# Patient Record
Sex: MALE | Race: WHITE | Employment: FULL TIME | URBAN - METROPOLITAN AREA
[De-identification: names, ages, dates, MRNs, and addresses within clinical notes are randomized per-mention and may not be internally consistent; named-entity substitution may affect disease eponyms.]

---

## 2017-04-03 ENCOUNTER — OFFICE VISIT (OUTPATIENT)
Dept: FAMILY MEDICINE CLINIC | Facility: CLINIC | Age: 52
End: 2017-04-03

## 2017-04-03 VITALS
DIASTOLIC BLOOD PRESSURE: 78 MMHG | HEIGHT: 71 IN | TEMPERATURE: 98.2 F | BODY MASS INDEX: 30.94 KG/M2 | OXYGEN SATURATION: 98 % | HEART RATE: 70 BPM | WEIGHT: 221 LBS | SYSTOLIC BLOOD PRESSURE: 132 MMHG | RESPIRATION RATE: 17 BRPM

## 2017-04-03 DIAGNOSIS — B30.9 VIRAL CONJUNCTIVITIS: ICD-10-CM

## 2017-04-03 DIAGNOSIS — J02.9 SORE THROAT: ICD-10-CM

## 2017-04-03 DIAGNOSIS — J32.9 VIRAL SINUSITIS: Primary | ICD-10-CM

## 2017-04-03 DIAGNOSIS — B97.89 VIRAL SINUSITIS: Primary | ICD-10-CM

## 2017-04-03 LAB
S PYO AG THROAT QL: NEGATIVE
VALID INTERNAL CONTROL?: YES

## 2017-04-03 RX ORDER — AMOXICILLIN AND CLAVULANATE POTASSIUM 875; 125 MG/1; MG/1
1 TABLET, FILM COATED ORAL 2 TIMES DAILY
Qty: 10 TAB | Refills: 0 | Status: SHIPPED | OUTPATIENT
Start: 2017-04-03 | End: 2017-04-08

## 2017-04-03 NOTE — PATIENT INSTRUCTIONS
Pinkeye: Care Instructions  Your Care Instructions    Pinkeye is redness and swelling of the eye surface and the conjunctiva (the lining of the eyelid and the covering of the white part of the eye). Pinkeye is also called conjunctivitis. Pinkeye is often caused by infection with bacteria or a virus. Dry air, allergies, smoke, and chemicals are other common causes. Pinkeye often clears on its own in 7 to 10 days. Antibiotics only help if the pinkeye is caused by bacteria. Pinkeye caused by infection spreads easily. If an allergy or chemical is causing pinkeye, it will not go away unless you can avoid whatever is causing it. Follow-up care is a key part of your treatment and safety. Be sure to make and go to all appointments, and call your doctor if you are having problems. Its also a good idea to know your test results and keep a list of the medicines you take. How can you care for yourself at home? · Wash your hands often. Always wash them before and after you treat pinkeye or touch your eyes or face. · Use moist cotton or a clean, wet cloth to remove crust. Wipe from the inside corner of the eye to the outside. Use a clean part of the cloth for each wipe. · Put cold or warm wet cloths on your eye a few times a day if the eye hurts. · Do not wear contact lenses or eye makeup until the pinkeye is gone. Throw away any eye makeup you were using when you got pinkeye. Clean your contacts and storage case. If you wear disposable contacts, use a new pair when your eye has cleared and it is safe to wear contacts again. · If the doctor gave you antibiotic ointment or eyedrops, use them as directed. Use the medicine for as long as instructed, even if your eye starts looking better soon. Keep the bottle tip clean, and do not let it touch the eye area. · To put in eyedrops or ointment:  ¨ Tilt your head back, and pull your lower eyelid down with one finger.   ¨ Drop or squirt the medicine inside the lower lid.  ¨ Close your eye for 30 to 60 seconds to let the drops or ointment move around. ¨ Do not touch the ointment or dropper tip to your eyelashes or any other surface. · Do not share towels, pillows, or washcloths while you have pinkeye. When should you call for help? Call your doctor now or seek immediate medical care if:  · You have pain in your eye, not just irritation on the surface. · You have a change in vision or loss of vision. · You have an increase in discharge from the eye. · Your eye has not started to improve or begins to get worse within 48 hours after you start using antibiotics. · Pinkeye lasts longer than 7 days. Watch closely for changes in your health, and be sure to contact your doctor if you have any problems. Where can you learn more? Go to http://deepa-marta.info/. Enter Y392 in the search box to learn more about \"Pinkeye: Care Instructions. \"  Current as of: May 27, 2016  Content Version: 11.2  © 1201-9099 [employer]. Care instructions adapted under license by [employer] (which disclaims liability or warranty for this information). If you have questions about a medical condition or this instruction, always ask your healthcare professional. [address] any warranty or liability for your use of this information. Sinusitis: Care Instructions  Your Care Instructions    Sinusitis is an infection of the lining of the sinus cavities in your head. Sinusitis often follows a cold. It causes pain and pressure in your head and face. In most cases, sinusitis gets better on its own in 1 to 2 weeks. But some mild symptoms may last for several weeks. Sometimes antibiotics are needed. Follow-up care is a key part of your treatment and safety. Be sure to make and go to all appointments, and call your doctor if you are having problems.  It's also a good idea to know your test results and keep a list of the medicines you take.  How can you care for yourself at home? · Take an over-the-counter pain medicine, such as acetaminophen (Tylenol), ibuprofen (Advil, Motrin), or naproxen (Aleve). Read and follow all instructions on the label. · If the doctor prescribed antibiotics, take them as directed. Do not stop taking them just because you feel better. You need to take the full course of antibiotics. · Be careful when taking over-the-counter cold or flu medicines and Tylenol at the same time. Many of these medicines have acetaminophen, which is Tylenol. Read the labels to make sure that you are not taking more than the recommended dose. Too much acetaminophen (Tylenol) can be harmful. · Breathe warm, moist air from a steamy shower, a hot bath, or a sink filled with hot water. Avoid cold, dry air. Using a humidifier in your home may help. Follow the directions for cleaning the machine. · Use saline (saltwater) nasal washes to help keep your nasal passages open and wash out mucus and bacteria. You can buy saline nose drops at a grocery store or drugstore. Or you can make your own at home by adding 1 teaspoon of salt and 1 teaspoon of baking soda to 2 cups of distilled water. If you make your own, fill a bulb syringe with the solution, insert the tip into your nostril, and squeeze gently. Marques Rosman your nose. · Put a hot, wet towel or a warm gel pack on your face 3 or 4 times a day for 5 to 10 minutes each time. · Try a decongestant nasal spray like oxymetazoline (Afrin). Do not use it for more than 3 days in a row. Using it for more than 3 days can make your congestion worse. When should you call for help? Call your doctor now or seek immediate medical care if:  · You have new or worse swelling or redness in your face or around your eyes. · You have a new or higher fever. Watch closely for changes in your health, and be sure to contact your doctor if:  · You have new or worse facial pain.   · The mucus from your nose becomes thicker (like pus) or has new blood in it. · You are not getting better as expected. Where can you learn more? Go to http://deepa-marta.info/. Enter O336 in the search box to learn more about \"Sinusitis: Care Instructions. \"  Current as of: July 29, 2016  Content Version: 11.2  © [id number] [employer]. Care instructions adapted under license by [employer] (which disclaims liability or warranty for this information). If you have questions about a medical condition or this instruction, always ask your healthcare professional. [address] any warranty or liability for your use of this information. Amoxicillin/Clavulanate Potassium (By mouth)   Amoxicillin (x-hft-[id number]-AG-in), Clavulanate Potassium (PLDQ-he-ha-rich [id number]-BGF-xh-um)  Treats infections. This medicine is a penicillin antibiotic. Brand Name(s):Augmentin, Augmentin ES-600, Augmentin XR   There may be other brand names for this medicine. When This Medicine Should Not Be Used: This medicine is not right for everyone. Do not use it if you had an allergic reaction to amoxicillin, clavulanate, or a similar antibiotic (penicillin or cephalosporin), or if you had liver problems caused by Augmentin®. How to Use This Medicine:   Liquid, Tablet, Chewable Tablet, Long Acting Tablet  · Your doctor will tell you how much medicine to use. Do not use more than directed. · Take this medicine with a snack or at the beginning of a meal to help prevent nausea. · Chewable tablets: Chew the tablet completely before you swallow it. · Measure the oral liquid medicine with a marked measuring spoon, oral syringe, or medicine cup. Shake the medicine well just before you measure each dose. Rinse the spoon or dropper after each use. · Swallow the extended-release tablet whole. Do not crush, break, or chew it.   · Take all of the medicine in your prescription to clear up your infection, even if you feel better after the first few doses. · Missed dose: Take a dose as soon as you remember. If it is almost time for your next dose, wait until then and take a regular dose. Do not take extra medicine to make up for a missed dose. · Tablet, extended-release tablet, chewable tablet: Store at room temperature, away from heat, moisture, and direct light. · Oral liquid: Store in the refrigerator. Do not freeze. · Throw away any unused oral liquid after 10 days. Drugs and Foods to Avoid:   Ask your doctor or pharmacist before using any other medicine, including over-the-counter medicines, vitamins, and herbal products. · Some medicines can affect how this medicine works. Tell your doctor if you are taking a blood thinner (such as warfarin), allopurinol, or probenecid. Warnings While Using This Medicine:   · Tell your doctor if you are pregnant or breastfeeding, or if you have kidney disease, liver disease, or mononucleosis (mono). · Birth control pills may not work as well while you are taking this medicine. Use another form of birth control to prevent pregnancy. · This medicine can cause diarrhea. Call your doctor if the diarrhea becomes severe, does not stop, or is bloody. Do not take any medicine to stop diarrhea until you have talked to your doctor. Diarrhea can occur 2 months or more after you stop taking this medicine. · Tell any doctor or dentist who treats you that you are using this medicine. This medicine may affect certain medical test results. · Call your doctor if your symptoms do not improve or if they get worse. · The chewable tablet and oral liquid contain phenylalanine. Talk to your doctor before you use this medicine if you have phenylketonuria (PKU). · Keep all medicine out of the reach of children. Never share your medicine with anyone.   Possible Side Effects While Using This Medicine:   Call your doctor right away if you notice any of these side effects:  · Allergic reaction: Itching or hives, swelling in your face or hands, swelling or tingling in your mouth or throat, chest tightness, trouble breathing  · Blistering, peeling, red skin rash  · Change in how much or how often you urinate  · Dark urine or pale stools, nausea, vomiting, loss of appetite, stomach pain, yellow eyes or skin  · Diarrhea that may contain blood, stomach cramps  If you notice these less serious side effects, talk with your doctor:   · Diaper rash  · Mild diarrhea, nausea, vomiting  · Tooth discoloration (in children)  If you notice other side effects that you think are caused by this medicine, tell your doctor. Call your doctor for medical advice about side effects. You may report side effects to FDA at 0-598-QGI-3573  © 2016 [address] is for End User's use only and may not be sold, redistributed or otherwise used for commercial purposes. The above information is an  only. It is not intended as medical advice for individual conditions or treatments. Talk to your doctor, nurse or pharmacist before following any medical regimen to see if it is safe and effective for you.

## 2017-04-03 NOTE — PROGRESS NOTES
Subjective: (As above and below)     Chief Complaint   Patient presents with    Other     sinus, cough, nasal symptoms, possible pink eye     he is a 49y.o. year old male who presents for evaluation. 5 day history of sinus pressure, cough, runny nose, nasal congestion, drip down throat, sore throat. Associated symptoms: both eyes red and irritated thinks may be pink eye. Watery no purulent discharge. No measure fever. Did get flu shot. States never had allergies to pollen. Sypmtoms constant and have not improved. Has been using nyquil, dayquil without relief. Review of Systems - negative except as listed above    Reviewed PmHx, RxHx, FmHx, SocHx, AllgHx and updated in chart. Family History   Problem Relation Age of Onset    Cancer Mother     Cancer Father        Past Medical History:   Diagnosis Date    GERD (gastroesophageal reflux disease)     Gout     Gout       Social History     Social History    Marital status:      Spouse name: N/A    Number of children: N/A    Years of education: N/A     Social History Main Topics    Smoking status: Never Smoker    Smokeless tobacco: Never Used    Alcohol use 3.6 oz/week     6 Cans of beer per week    Drug use: No    Sexual activity: Not Asked     Other Topics Concern    None     Social History Narrative          Current Outpatient Prescriptions   Medication Sig    amoxicillin-clavulanate (AUGMENTIN) 875-125 mg per tablet Take 1 Tab by mouth two (2) times a day for 5 days.  allopurinol (ZYLOPRIM) 300 mg tablet Take  by mouth daily.  allopurinol (ZYLOPRIM) 100 mg tablet Take 100 mg by mouth daily.  omeprazole (PRILOSEC) 20 mg capsule Take 20 mg by mouth daily. No current facility-administered medications for this visit.         Objective:     Vitals:    04/03/17 1609   BP: 132/78   Pulse: 70   Resp: 17   Temp: 98.2 °F (36.8 °C)   TempSrc: Oral   SpO2: 98%   Weight: 221 lb (100.2 kg)   Height: 5' 11\" (1.803 m) Physical Examination:   General appearance - alert, well appearing, and in no distress  Mental status - alert, oriented to person, place, and time  Eyes - Sclera erythematous  R and L. No purulent discharge. PERRLA. Ears - bilateral TM's and external ear canals normal  Nose - R and L nasal passage decreased patency. No purulent discharge. Mouth - Oropharyngeal erythema. No exudates or swelling. Neck - supple, no adenopathy  Chest - clear to auscultation, no wheezes, rales or rhonchi, symmetric air entry  Heart - normal rate, regular rhythm, normal S1, S2, no murmurs, rubs, clicks or gallops        Assessment/ Plan:       ICD-10-CM ICD-9-CM    1. Viral sinusitis J32.9 473.9 amoxicillin-clavulanate (AUGMENTIN) 875-125 mg per tablet    B97.89 079.99    2. Viral conjunctivitis B30.9 077.99    3. Sore throat J02.9 462 AMB POC RAPID STREP A      REFERRAL TO PRIMARY CARE        1. Viral sinusitis  Watch and wait antibiotic over next 4 days. Educated antibiotic wont treat viral symptoms. Only to fill for worsening after 4 days of below supportive therapy. -Afrin nasal spray: 1 spray each nostril 2 times daily for no more than 3 days.  -Discontinue OTC Day/Nyquil as it can be too drying. Rx:  - amoxicillin-clavulanate (AUGMENTIN) 875-125 mg per tablet; Take 1 Tab by mouth two (2) times a day for 5 days. Dispense: 10 Tab; Refill: 0    2. Viral conjunctivitis  OTC natural tears or redness relief for next 3-4 days. 3. Sore throat  2/2 viral process/post nasal drip. May try Zyrtec OTC 1 tab by mouth day for next week. Rapid strep negative. - AMB POC RAPID STREP A    Follow up without improvement in next week. May return sooner for new or significantly worsening symptoms. I have discussed the diagnosis with the patient and the intended plan as seen in the above orders. The patient has received an after-visit summary and questions were answered concerning future plans.      Medication Side Effects and Warnings were discussed with patient: yes  Patient Labs were reviewed: yes  Patient Past Records were reviewed:  yes    Darian Suarez, NP

## 2017-04-03 NOTE — MR AVS SNAPSHOT
Visit Information Date & Time Provider Department Dept. Phone Encounter #  
 4/3/2017  3:30 PM Nicolas Addison, [address] [phone] [id number] Upcoming Health Maintenance Date Due DTaP/Tdap/Td series (1 - Tdap) 10/14/1987 INFLUENZA AGE 9 TO ADULT 8/1/2016 FOBT Q 1 YEAR AGE 50-75 10/14/2016 Allergies as of 4/3/2017  Review Complete On: 4/3/2017 By: Nicolas Addison NP No Known Allergies Current Immunizations  Never Reviewed No immunizations on file. Not reviewed this visit You Were Diagnosed With   
  
 Codes Comments Viral sinusitis    -  Primary ICD-10-CM: J32.9, B97.89 ICD-9-CM: 473.9, 079.99 Viral conjunctivitis     ICD-10-CM: B30.9 ICD-9-CM: 077.99 Sore throat     ICD-10-CM: J02.9 ICD-9-CM: 338 Vitals BP Pulse Temp Resp Height(growth percentile) Weight(growth percentile) 132/78 70 98.2 °F (36.8 °C) (Oral) 17 5' 11\" (1.803 m) 221 lb (100.2 kg) SpO2 BMI Smoking Status 98% 30.82 kg/m2 Never Smoker Vitals History BMI and BSA Data Body Mass Index Body Surface Area  
 30.82 kg/m 2 2.24 m 2 Preferred Pharmacy Pharmacy Name Phone [facility] [place], [address] [facility] Your Updated Medication List  
  
   
This list is accurate as of: 4/3/17  4:33 PM.  Always use your most recent med list.  
  
  
  
  
 * allopurinol 300 mg tablet Commonly known as:  Yudy Poisson Take  by mouth daily. * allopurinol 100 mg tablet Commonly known as:  Yudy Poisson Take 100 mg by mouth daily. amoxicillin-clavulanate 875-125 mg per tablet Commonly known as:  AUGMENTIN Take 1 Tab by mouth two (2) times a day for 5 days. omeprazole 20 mg capsule Commonly known as:  PRILOSEC Take 20 mg by mouth daily. * Notice:   This list has 2 medication(s) that are the same as other medications prescribed for you. Read the directions carefully, and ask your doctor or other care provider to review them with you. Prescriptions Sent to Pharmacy Refills  
 amoxicillin-clavulanate (AUGMENTIN) 875-125 mg per tablet 0 Sig: Take 1 Tab by mouth two (2) times a day for 5 days. Class: Normal  
 Pharmacy: [facility] [id number] [place] [address] #: [phone] Route: Oral  
  
We Performed the Following AMB POC RAPID STREP A [31158 CPT(R)] Patient Instructions Pinkeye: Care Instructions Your Care Instructions Pinkeye is redness and swelling of the eye surface and the conjunctiva (the lining of the eyelid and the covering of the white part of the eye). Pinkeye is also called conjunctivitis. Pinkeye is often caused by infection with bacteria or a virus. Dry air, allergies, smoke, and chemicals are other common causes. Pinkeye often clears on its own in 7 to 10 days. Antibiotics only help if the pinkeye is caused by bacteria. Pinkeye caused by infection spreads easily. If an allergy or chemical is causing pinkeye, it will not go away unless you can avoid whatever is causing it. Follow-up care is a key part of your treatment and safety. Be sure to make and go to all appointments, and call your doctor if you are having problems. Its also a good idea to know your test results and keep a list of the medicines you take. How can you care for yourself at home? · Wash your hands often. Always wash them before and after you treat pinkeye or touch your eyes or face. · Use moist cotton or a clean, wet cloth to remove crust. Wipe from the inside corner of the eye to the outside. Use a clean part of the cloth for each wipe. · Put cold or warm wet cloths on your eye a few times a day if the eye hurts. · Do not wear contact lenses or eye makeup until the pinkeye is gone. Throw away any eye makeup you were using when you got pinkeye.  Clean your contacts and storage case. If you wear disposable contacts, use a new pair when your eye has cleared and it is safe to wear contacts again. · If the doctor gave you antibiotic ointment or eyedrops, use them as directed. Use the medicine for as long as instructed, even if your eye starts looking better soon. Keep the bottle tip clean, and do not let it touch the eye area. · To put in eyedrops or ointment: ¨ Tilt your head back, and pull your lower eyelid down with one finger. ¨ Drop or squirt the medicine inside the lower lid. ¨ Close your eye for 30 to 60 seconds to let the drops or ointment move around. ¨ Do not touch the ointment or dropper tip to your eyelashes or any other surface. · Do not share towels, pillows, or washcloths while you have pinkeye. When should you call for help? Call your doctor now or seek immediate medical care if: 
· You have pain in your eye, not just irritation on the surface. · You have a change in vision or loss of vision. · You have an increase in discharge from the eye. · Your eye has not started to improve or begins to get worse within 48 hours after you start using antibiotics. · Pinkeye lasts longer than 7 days. Watch closely for changes in your health, and be sure to contact your doctor if you have any problems. Where can you learn more? Go to http://deepa-marta.info/. Enter Y392 in the search box to learn more about \"Pinkeye: Care Instructions. \" Current as of: May 27, 2016 Content Version: 11.2 © 4685-6599 [employer]. Care instructions adapted under license by [employer] (which disclaims liability or warranty for this information). If you have questions about a medical condition or this instruction, always ask your healthcare professional. [address] any warranty or liability for your use of this information. Sinusitis: Care Instructions Your Care Instructions Sinusitis is an infection of the lining of the sinus cavities in your head. Sinusitis often follows a cold. It causes pain and pressure in your head and face. In most cases, sinusitis gets better on its own in 1 to 2 weeks. But some mild symptoms may last for several weeks. Sometimes antibiotics are needed. Follow-up care is a key part of your treatment and safety. Be sure to make and go to all appointments, and call your doctor if you are having problems. It's also a good idea to know your test results and keep a list of the medicines you take. How can you care for yourself at home? · Take an over-the-counter pain medicine, such as acetaminophen (Tylenol), ibuprofen (Advil, Motrin), or naproxen (Aleve). Read and follow all instructions on the label. · If the doctor prescribed antibiotics, take them as directed. Do not stop taking them just because you feel better. You need to take the full course of antibiotics. · Be careful when taking over-the-counter cold or flu medicines and Tylenol at the same time. Many of these medicines have acetaminophen, which is Tylenol. Read the labels to make sure that you are not taking more than the recommended dose. Too much acetaminophen (Tylenol) can be harmful. · Breathe warm, moist air from a steamy shower, a hot bath, or a sink filled with hot water. Avoid cold, dry air. Using a humidifier in your home may help. Follow the directions for cleaning the machine. · Use saline (saltwater) nasal washes to help keep your nasal passages open and wash out mucus and bacteria. You can buy saline nose drops at a grocery store or drugstore. Or you can make your own at home by adding 1 teaspoon of salt and 1 teaspoon of baking soda to 2 cups of distilled water. If you make your own, fill a bulb syringe with the solution, insert the tip into your nostril, and squeeze gently. Rocio Artie your nose.  
· Put a hot, wet towel or a warm gel pack on your face 3 or 4 times a day for 5 to 10 minutes each time. · Try a decongestant nasal spray like oxymetazoline (Afrin). Do not use it for more than 3 days in a row. Using it for more than 3 days can make your congestion worse. When should you call for help? Call your doctor now or seek immediate medical care if: 
· You have new or worse swelling or redness in your face or around your eyes. · You have a new or higher fever. Watch closely for changes in your health, and be sure to contact your doctor if: 
· You have new or worse facial pain. · The mucus from your nose becomes thicker (like pus) or has new blood in it. · You are not getting better as expected. Where can you learn more? Go to http://deepa-marta.info/. Enter Q815 in the search box to learn more about \"Sinusitis: Care Instructions. \" Current as of: July 29, 2016 Content Version: 11.2 © 8738-0330 [employer]. Care instructions adapted under license by [employer] (which disclaims liability or warranty for this information). If you have questions about a medical condition or this instruction, always ask your healthcare professional. [place] [address] any warranty or liability for your use of this information. Amoxicillin/Clavulanate Potassium (By mouth) Amoxicillin (y-bpo-p-AG-in), Clavulanate Potassium (XPVN-gq-gp-rich ere-VLE-ve-um) Treats infections. This medicine is a penicillin antibiotic. Brand Name(s):Augmentin, Augmentin ES-600, Augmentin XR There may be other brand names for this medicine. When This Medicine Should Not Be Used: This medicine is not right for everyone. Do not use it if you had an allergic reaction to amoxicillin, clavulanate, or a similar antibiotic (penicillin or cephalosporin), or if you had liver problems caused by Augmentin®. How to Use This Medicine:  
Liquid, Tablet, Chewable Tablet, Long Acting Tablet · Your doctor will tell you how much medicine to use. Do not use more than directed. · Take this medicine with a snack or at the beginning of a meal to help prevent nausea. · Chewable tablets: Chew the tablet completely before you swallow it. · Measure the oral liquid medicine with a marked measuring spoon, oral syringe, or medicine cup. Shake the medicine well just before you measure each dose. Rinse the spoon or dropper after each use. · Swallow the extended-release tablet whole. Do not crush, break, or chew it. · Take all of the medicine in your prescription to clear up your infection, even if you feel better after the first few doses. · Missed dose: Take a dose as soon as you remember. If it is almost time for your next dose, wait until then and take a regular dose. Do not take extra medicine to make up for a missed dose. · Tablet, extended-release tablet, chewable tablet: Store at room temperature, away from heat, moisture, and direct light. · Oral liquid: Store in the refrigerator. Do not freeze. · Throw away any unused oral liquid after 10 days. Drugs and Foods to Avoid: Ask your doctor or pharmacist before using any other medicine, including over-the-counter medicines, vitamins, and herbal products. · Some medicines can affect how this medicine works. Tell your doctor if you are taking a blood thinner (such as warfarin), allopurinol, or probenecid. Warnings While Using This Medicine: · Tell your doctor if you are pregnant or breastfeeding, or if you have kidney disease, liver disease, or mononucleosis (mono). · Birth control pills may not work as well while you are taking this medicine. Use another form of birth control to prevent pregnancy. · This medicine can cause diarrhea. Call your doctor if the diarrhea becomes severe, does not stop, or is bloody. Do not take any medicine to stop diarrhea until you have talked to your doctor.  Diarrhea can occur 2 months or more after you stop taking this medicine. · Tell any doctor or dentist who treats you that you are using this medicine. This medicine may affect certain medical test results. · Call your doctor if your symptoms do not improve or if they get worse. · The chewable tablet and oral liquid contain phenylalanine. Talk to your doctor before you use this medicine if you have phenylketonuria (PKU). · Keep all medicine out of the reach of children. Never share your medicine with anyone. Possible Side Effects While Using This Medicine:  
Call your doctor right away if you notice any of these side effects: · Allergic reaction: Itching or hives, swelling in your face or hands, swelling or tingling in your mouth or throat, chest tightness, trouble breathing · Blistering, peeling, red skin rash · Change in how much or how often you urinate · Dark urine or pale stools, nausea, vomiting, loss of appetite, stomach pain, yellow eyes or skin · Diarrhea that may contain blood, stomach cramps If you notice these less serious side effects, talk with your doctor: · Diaper rash · Mild diarrhea, nausea, vomiting · Tooth discoloration (in children) If you notice other side effects that you think are caused by this medicine, tell your doctor. Call your doctor for medical advice about side effects. You may report side effects to FDA at 3-727-FDA-6698 © 2016 [address] is for End User's use only and may not be sold, redistributed or otherwise used for commercial purposes. The above information is an  only. It is not intended as medical advice for individual conditions or treatments. Talk to your doctor, nurse or pharmacist before following any medical regimen to see if it is safe and effective for you. Introducing [facility]!    
 [employer] introduces [employer] patient portal. Now you can access parts of your medical record, email your doctor's office, and request medication refills online. 1. In your internet browser, go to [email or web address] 2. Click on the First Time User? Click Here link in the Sign In box. You will see the New Member Sign Up page. 3. Enter your [employer] Access Code exactly as it appears below. You will not need to use this code after youve completed the sign-up process. If you do not sign up before the expiration date, you must request a new code. · [employer] Access Code: [email or web address] Expires: 7/2/2017  4:33 PM 
 
4. Enter the last four digits of your Social Security Number (xxxx) and Date of Birth (mm/dd/yyyy) as indicated and click Submit. You will be taken to the next sign-up page. 5. Create a [employer] ID. This will be your [employer] login ID and cannot be changed, so think of one that is secure and easy to remember. 6. Create a [employer] password. You can change your password at any time. 7. Enter your Password Reset Question and Answer. This can be used at a later time if you forget your password. 8. Enter your e-mail address. You will receive e-mail notification when new information is available in [address] 9. Click Sign Up. You can now view and download portions of your medical record. 10. Click the Download Summary menu link to download a portable copy of your medical information. If you have questions, please visit the Frequently Asked Questions section of the [employer] website. Remember, [employer] is NOT to be used for urgent needs. For medical emergencies, dial 911. Now available from your iPhone and Android! Please provide this summary of care documentation to your next provider. Your primary care clinician is listed as NOT ON FILE. If you have any questions after today's visit, please call [phone].